# Patient Record
Sex: FEMALE | Race: BLACK OR AFRICAN AMERICAN | Employment: FULL TIME | URBAN - METROPOLITAN AREA
[De-identification: names, ages, dates, MRNs, and addresses within clinical notes are randomized per-mention and may not be internally consistent; named-entity substitution may affect disease eponyms.]

---

## 2020-06-17 ENCOUNTER — ANESTHESIA EVENT (OUTPATIENT)
Dept: SURGERY | Age: 40
End: 2020-06-17
Payer: COMMERCIAL

## 2020-06-17 RX ORDER — HYDROMORPHONE HYDROCHLORIDE 2 MG/ML
0.5 INJECTION, SOLUTION INTRAMUSCULAR; INTRAVENOUS; SUBCUTANEOUS
Status: CANCELLED | OUTPATIENT
Start: 2020-06-17

## 2020-06-17 RX ORDER — SODIUM CHLORIDE, SODIUM LACTATE, POTASSIUM CHLORIDE, CALCIUM CHLORIDE 600; 310; 30; 20 MG/100ML; MG/100ML; MG/100ML; MG/100ML
125 INJECTION, SOLUTION INTRAVENOUS CONTINUOUS
Status: CANCELLED | OUTPATIENT
Start: 2020-06-17

## 2020-06-17 RX ORDER — OXYCODONE HYDROCHLORIDE 5 MG/1
10 TABLET ORAL
Status: CANCELLED | OUTPATIENT
Start: 2020-06-17 | End: 2020-06-18

## 2020-06-17 RX ORDER — NALOXONE HYDROCHLORIDE 0.4 MG/ML
0.1 INJECTION, SOLUTION INTRAMUSCULAR; INTRAVENOUS; SUBCUTANEOUS AS NEEDED
Status: CANCELLED | OUTPATIENT
Start: 2020-06-17

## 2020-06-18 ENCOUNTER — ANESTHESIA (OUTPATIENT)
Dept: SURGERY | Age: 40
End: 2020-06-18
Payer: COMMERCIAL

## 2020-06-18 ENCOUNTER — HOSPITAL ENCOUNTER (OUTPATIENT)
Age: 40
Setting detail: OUTPATIENT SURGERY
Discharge: HOME | End: 2020-06-18
Attending: ORTHOPAEDIC SURGERY | Admitting: ORTHOPAEDIC SURGERY
Payer: COMMERCIAL

## 2020-06-18 ENCOUNTER — APPOINTMENT (OUTPATIENT)
Dept: GENERAL RADIOLOGY | Age: 40
End: 2020-06-18
Attending: ORTHOPAEDIC SURGERY
Payer: COMMERCIAL

## 2020-06-18 VITALS
OXYGEN SATURATION: 95 % | RESPIRATION RATE: 16 BRPM | HEART RATE: 81 BPM | SYSTOLIC BLOOD PRESSURE: 114 MMHG | TEMPERATURE: 98.4 F | BODY MASS INDEX: 40.03 KG/M2 | WEIGHT: 287 LBS | DIASTOLIC BLOOD PRESSURE: 69 MMHG

## 2020-06-18 PROCEDURE — 77030013921: Performed by: ORTHOPAEDIC SURGERY

## 2020-06-18 PROCEDURE — 74011250636 HC RX REV CODE- 250/636: Performed by: ORTHOPAEDIC SURGERY

## 2020-06-18 PROCEDURE — 74011000250 HC RX REV CODE- 250: Performed by: REGISTERED NURSE

## 2020-06-18 PROCEDURE — 77030003748: Performed by: ORTHOPAEDIC SURGERY

## 2020-06-18 PROCEDURE — 74011000250 HC RX REV CODE- 250: Performed by: ANESTHESIOLOGY

## 2020-06-18 PROCEDURE — 77030020274 HC MISC IMPL ORTHOPEDIC: Performed by: ORTHOPAEDIC SURGERY

## 2020-06-18 PROCEDURE — 74011250636 HC RX REV CODE- 250/636: Performed by: REGISTERED NURSE

## 2020-06-18 PROCEDURE — 77030002916 HC SUT ETHLN J&J -A: Performed by: ORTHOPAEDIC SURGERY

## 2020-06-18 PROCEDURE — 76210000063 HC OR PH I REC FIRST 0.5 HR: Performed by: ORTHOPAEDIC SURGERY

## 2020-06-18 PROCEDURE — 76942 ECHO GUIDE FOR BIOPSY: CPT | Performed by: ORTHOPAEDIC SURGERY

## 2020-06-18 PROCEDURE — 76210000026 HC REC RM PH II 1 TO 1.5 HR: Performed by: ORTHOPAEDIC SURGERY

## 2020-06-18 PROCEDURE — 77030037713 HC CLOSR DEV INCIS ZIP STRY -B: Performed by: ORTHOPAEDIC SURGERY

## 2020-06-18 PROCEDURE — 77030000032 HC CUF TRNQT ZIMM -B: Performed by: ORTHOPAEDIC SURGERY

## 2020-06-18 PROCEDURE — C1713 ANCHOR/SCREW BN/BN,TIS/BN: HCPCS | Performed by: ORTHOPAEDIC SURGERY

## 2020-06-18 PROCEDURE — 77030041490 HC NDL KT BN MRW ASPIR CLNG -B: Performed by: ORTHOPAEDIC SURGERY

## 2020-06-18 PROCEDURE — 77030018836 HC SOL IRR NACL ICUM -A: Performed by: ORTHOPAEDIC SURGERY

## 2020-06-18 PROCEDURE — 77030002933 HC SUT MCRYL J&J -A: Performed by: ORTHOPAEDIC SURGERY

## 2020-06-18 PROCEDURE — 74011250636 HC RX REV CODE- 250/636: Performed by: ANESTHESIOLOGY

## 2020-06-18 PROCEDURE — 77030037087 HC DEV ART BMC PRP CLNG -H: Performed by: ORTHOPAEDIC SURGERY

## 2020-06-18 PROCEDURE — 76060000034 HC ANESTHESIA 1.5 TO 2 HR: Performed by: ORTHOPAEDIC SURGERY

## 2020-06-18 PROCEDURE — 74011000250 HC RX REV CODE- 250: Performed by: ORTHOPAEDIC SURGERY

## 2020-06-18 PROCEDURE — 76010000162 HC OR TIME 1.5 TO 2 HR INTENSV-TIER 1: Performed by: ORTHOPAEDIC SURGERY

## 2020-06-18 PROCEDURE — 77030010507 HC ADH SKN DERMBND J&J -B: Performed by: ORTHOPAEDIC SURGERY

## 2020-06-18 PROCEDURE — 76010010054 HC POST OP PAIN BLOCK: Performed by: ORTHOPAEDIC SURGERY

## 2020-06-18 PROCEDURE — 74011250637 HC RX REV CODE- 250/637: Performed by: ANESTHESIOLOGY

## 2020-06-18 PROCEDURE — 77030025281 HC SPLNT ORTHGLS 1 BSNM -B: Performed by: ORTHOPAEDIC SURGERY

## 2020-06-18 PROCEDURE — 77030020275 HC MISC ORTHOPEDIC: Performed by: ORTHOPAEDIC SURGERY

## 2020-06-18 RX ORDER — CEFAZOLIN SODIUM/WATER 2 G/20 ML
2 SYRINGE (ML) INTRAVENOUS ONCE
Status: DISCONTINUED | OUTPATIENT
Start: 2020-06-18 | End: 2020-06-18 | Stop reason: DRUGHIGH

## 2020-06-18 RX ORDER — SODIUM CHLORIDE 0.9 % (FLUSH) 0.9 %
5-40 SYRINGE (ML) INJECTION EVERY 8 HOURS
Status: DISCONTINUED | OUTPATIENT
Start: 2020-06-18 | End: 2020-06-18 | Stop reason: HOSPADM

## 2020-06-18 RX ORDER — LIDOCAINE HYDROCHLORIDE 10 MG/ML
0.1 INJECTION INFILTRATION; PERINEURAL AS NEEDED
Status: DISCONTINUED | OUTPATIENT
Start: 2020-06-18 | End: 2020-06-18 | Stop reason: HOSPADM

## 2020-06-18 RX ORDER — ONDANSETRON 2 MG/ML
INJECTION INTRAMUSCULAR; INTRAVENOUS AS NEEDED
Status: DISCONTINUED | OUTPATIENT
Start: 2020-06-18 | End: 2020-06-18 | Stop reason: HOSPADM

## 2020-06-18 RX ORDER — DEXAMETHASONE SODIUM PHOSPHATE 4 MG/ML
INJECTION, SOLUTION INTRA-ARTICULAR; INTRALESIONAL; INTRAMUSCULAR; INTRAVENOUS; SOFT TISSUE AS NEEDED
Status: DISCONTINUED | OUTPATIENT
Start: 2020-06-18 | End: 2020-06-18 | Stop reason: HOSPADM

## 2020-06-18 RX ORDER — LIDOCAINE HYDROCHLORIDE 20 MG/ML
INJECTION, SOLUTION EPIDURAL; INFILTRATION; INTRACAUDAL; PERINEURAL AS NEEDED
Status: DISCONTINUED | OUTPATIENT
Start: 2020-06-18 | End: 2020-06-18 | Stop reason: HOSPADM

## 2020-06-18 RX ORDER — SODIUM CHLORIDE 0.9 % (FLUSH) 0.9 %
5-40 SYRINGE (ML) INJECTION AS NEEDED
Status: DISCONTINUED | OUTPATIENT
Start: 2020-06-18 | End: 2020-06-18 | Stop reason: HOSPADM

## 2020-06-18 RX ORDER — PROPOFOL 10 MG/ML
INJECTION, EMULSION INTRAVENOUS AS NEEDED
Status: DISCONTINUED | OUTPATIENT
Start: 2020-06-18 | End: 2020-06-18 | Stop reason: HOSPADM

## 2020-06-18 RX ORDER — GABAPENTIN 300 MG/1
300 CAPSULE ORAL
Status: COMPLETED | OUTPATIENT
Start: 2020-06-18 | End: 2020-06-18

## 2020-06-18 RX ORDER — BUPIVACAINE HYDROCHLORIDE AND EPINEPHRINE 5; 5 MG/ML; UG/ML
INJECTION, SOLUTION EPIDURAL; INTRACAUDAL; PERINEURAL AS NEEDED
Status: DISCONTINUED | OUTPATIENT
Start: 2020-06-18 | End: 2020-06-18 | Stop reason: HOSPADM

## 2020-06-18 RX ORDER — FENTANYL CITRATE 50 UG/ML
100 INJECTION, SOLUTION INTRAMUSCULAR; INTRAVENOUS ONCE
Status: COMPLETED | OUTPATIENT
Start: 2020-06-18 | End: 2020-06-18

## 2020-06-18 RX ORDER — SODIUM CHLORIDE, SODIUM LACTATE, POTASSIUM CHLORIDE, CALCIUM CHLORIDE 600; 310; 30; 20 MG/100ML; MG/100ML; MG/100ML; MG/100ML
125 INJECTION, SOLUTION INTRAVENOUS CONTINUOUS
Status: DISCONTINUED | OUTPATIENT
Start: 2020-06-18 | End: 2020-06-18 | Stop reason: HOSPADM

## 2020-06-18 RX ORDER — HEPARIN SODIUM,PORCINE/PF 10 UNIT/ML
SYRINGE (ML) INTRAVENOUS AS NEEDED
Status: DISCONTINUED | OUTPATIENT
Start: 2020-06-18 | End: 2020-06-18 | Stop reason: HOSPADM

## 2020-06-18 RX ORDER — MIDAZOLAM HYDROCHLORIDE 1 MG/ML
2 INJECTION, SOLUTION INTRAMUSCULAR; INTRAVENOUS ONCE
Status: COMPLETED | OUTPATIENT
Start: 2020-06-18 | End: 2020-06-18

## 2020-06-18 RX ORDER — ACETAMINOPHEN 500 MG
1000 TABLET ORAL ONCE
Status: COMPLETED | OUTPATIENT
Start: 2020-06-18 | End: 2020-06-18

## 2020-06-18 RX ADMIN — MEPIVACAINE HYDROCHLORIDE 5 ML: 20 INJECTION, SOLUTION EPIDURAL; INFILTRATION at 08:22

## 2020-06-18 RX ADMIN — PHENYLEPHRINE HYDROCHLORIDE 100 MCG: 10 INJECTION INTRAVENOUS at 09:41

## 2020-06-18 RX ADMIN — MIDAZOLAM 2 MG: 1 INJECTION INTRAMUSCULAR; INTRAVENOUS at 08:13

## 2020-06-18 RX ADMIN — PROPOFOL 200 MG: 10 INJECTION, EMULSION INTRAVENOUS at 09:11

## 2020-06-18 RX ADMIN — FENTANYL CITRATE 100 MCG: 50 INJECTION INTRAMUSCULAR; INTRAVENOUS at 08:13

## 2020-06-18 RX ADMIN — PHENYLEPHRINE HYDROCHLORIDE 100 MCG: 10 INJECTION INTRAVENOUS at 09:25

## 2020-06-18 RX ADMIN — BUPIVACAINE HYDROCHLORIDE AND EPINEPHRINE BITARTRATE 20 ML: 5; .005 INJECTION, SOLUTION EPIDURAL; INTRACAUDAL; PERINEURAL at 08:19

## 2020-06-18 RX ADMIN — ONDANSETRON 4 MG: 2 INJECTION INTRAMUSCULAR; INTRAVENOUS at 10:26

## 2020-06-18 RX ADMIN — PHENYLEPHRINE HYDROCHLORIDE 100 MCG: 10 INJECTION INTRAVENOUS at 09:54

## 2020-06-18 RX ADMIN — PHENYLEPHRINE HYDROCHLORIDE 100 MCG: 10 INJECTION INTRAVENOUS at 09:22

## 2020-06-18 RX ADMIN — BUPIVACAINE HYDROCHLORIDE AND EPINEPHRINE BITARTRATE 5 ML: 5; .005 INJECTION, SOLUTION EPIDURAL; INTRACAUDAL; PERINEURAL at 08:22

## 2020-06-18 RX ADMIN — PHENYLEPHRINE HYDROCHLORIDE 200 MCG: 10 INJECTION INTRAVENOUS at 10:01

## 2020-06-18 RX ADMIN — GABAPENTIN 300 MG: 300 CAPSULE ORAL at 08:16

## 2020-06-18 RX ADMIN — LIDOCAINE HYDROCHLORIDE 60 MG: 20 INJECTION, SOLUTION EPIDURAL; INFILTRATION; INTRACAUDAL; PERINEURAL at 09:11

## 2020-06-18 RX ADMIN — ACETAMINOPHEN 1000 MG: 500 TABLET, FILM COATED ORAL at 08:15

## 2020-06-18 RX ADMIN — PHENYLEPHRINE HYDROCHLORIDE 200 MCG: 10 INJECTION INTRAVENOUS at 10:19

## 2020-06-18 RX ADMIN — SODIUM CHLORIDE, SODIUM LACTATE, POTASSIUM CHLORIDE, AND CALCIUM CHLORIDE 125 ML/HR: 600; 310; 30; 20 INJECTION, SOLUTION INTRAVENOUS at 08:00

## 2020-06-18 RX ADMIN — PHENYLEPHRINE HYDROCHLORIDE 100 MCG: 10 INJECTION INTRAVENOUS at 09:18

## 2020-06-18 RX ADMIN — DEXAMETHASONE SODIUM PHOSPHATE 4 MG: 4 INJECTION, SOLUTION INTRAMUSCULAR; INTRAVENOUS at 09:30

## 2020-06-18 RX ADMIN — CEFAZOLIN 3 G: 1 INJECTION, POWDER, FOR SOLUTION INTRAVENOUS at 09:17

## 2020-06-18 RX ADMIN — PHENYLEPHRINE HYDROCHLORIDE 200 MCG: 10 INJECTION INTRAVENOUS at 10:07

## 2020-06-18 RX ADMIN — MEPIVACAINE HYDROCHLORIDE 20 ML: 20 INJECTION, SOLUTION EPIDURAL; INFILTRATION at 08:19

## 2020-06-18 NOTE — BRIEF OP NOTE
Brief Postoperative Note    Patient: Jannette Burroughs  YOB: 1980  MRN: [id number]    Date of Procedure: 6/18/2020     Pre-Op Diagnosis: Arthritis of ankle, right [M19.071]    Post-Op Diagnosis: Same as preoperative diagnosis. Procedure(s):  RIGHT ACHILLES LENGTHENING  RIGHT ANKLE FUSION W/ CALCANEAL BONE MARROW ASPIRATE CHOICE ANES    Surgeon(s):  Hannah Azul MD    Surgical Assistant: None    Anesthesia: General     Estimated Blood Loss (mL): Minimal    Complications: None    Specimens: * No specimens in log *     Implants:   Implant Name Type Inv.  Item Serial No.  Lot No. LRB No. Used Action   [employer] ANTERIOR RIGHT PLATE     [id number] Right 1 Implanted   AR[employer] 4.5 LOCKING 32 SCREW     [id number] Right 3 Implanted   [employer] BB-TOK     [id number] Right 1 Implanted   AR[employer] 4.5 X 30MM SCREW     [id number] Right 2 Implanted   ARTHERX 4.5 X 28MM LOCKING SCREW    [employer] [id number] Right 1 Implanted   AR[employer]X 4.5 X 24MM SCREW    [employer] [id number] Right 1 Implanted   ARTHREX 5.5 X 55MM SCREW    [employer] [id number] Right 1 Implanted   ARTH[employer]X SOLUM IV      [id number] Right 1 Implanted       Drains: * No LDAs found *    Findings:     Electronically Signed by Pepe Banegas MD on 6/18/2020 at 4:46 PM

## 2020-06-18 NOTE — ANESTHESIA POSTPROCEDURE EVALUATION
Procedure(s):  RIGHT ACHILLES LENGTHENING  RIGHT ANKLE FUSION W/ CALCANEAL BONE MARROW ASPIRATE CHOICE ANES. general    Anesthesia Post Evaluation        Patient location during evaluation: PACU  Patient participation: complete - patient participated  Level of consciousness: responsive to verbal stimuli  Pain management: adequate  Airway patency: patent  Anesthetic complications: no  Cardiovascular status: acceptable  Respiratory status: acceptable  Hydration status: euvolemic        INITIAL Post-op Vital signs:   Vitals Value Taken Time   /76 6/18/2020 11:03 AM   Temp     Pulse 77 6/18/2020 11:05 AM   Resp 16 6/18/2020 11:03 AM   SpO2 96 % 6/18/2020 11:05 AM   Vitals shown include unvalidated device data.

## 2020-06-18 NOTE — ANESTHESIA PREPROCEDURE EVALUATION
Relevant Problems   No relevant active problems       Anesthetic History               Review of Systems / Medical History  Patient summary reviewed, nursing notes reviewed and pertinent labs reviewed    Pulmonary          Smoker (Ex)         Neuro/Psych              Cardiovascular    Hypertension              Exercise tolerance: >4 METS  Comments: Hx postpartum DVT   GI/Hepatic/Renal     GERD: well controlled           Endo/Other        Morbid obesity and arthritis     Other Findings   Comments: Sjogren's dz         Physical Exam    Airway  Mallampati: I  TM Distance: 4 - 6 cm  Neck ROM: normal range of motion   Mouth opening: Normal     Cardiovascular  Regular rate and rhythm,  S1 and S2 normal,  no murmur, click, rub, or gallop             Dental  No notable dental hx       Pulmonary  Breath sounds clear to auscultation               Abdominal  GI exam deferred       Other Findings            Anesthetic Plan    ASA: 3  Anesthesia type: general      Post-op pain plan if not by surgeon: peripheral nerve block single      Anesthetic plan and risks discussed with: Patient    Sister present

## 2020-06-18 NOTE — ANESTHESIA PROCEDURE NOTES
Peripheral Block    Start time: 6/18/2020 8:20 AM  End time: 6/18/2020 8:22 AM  Performed by: Pedrito Sosa MD  Authorized by: Pedrito Sosa MD       Pre-procedure:    Indications: at surgeon's request and post-op pain management    Preanesthetic Checklist: patient identified, risks and benefits discussed, site marked, timeout performed, anesthesia consent given and patient being monitored    Timeout Time: 08:20          Block Type:   Block Type:  Femoral single shot  Laterality:  Right  Monitoring:  Responsive to questions, standard ASA monitoring, continuous pulse ox, oxygen, frequent vital sign checks and heart rate  Injection Technique:  Single shot  Procedures: ultrasound guided and nerve stimulator    Patient Position: supine  Prep: chlorhexidine    Location:  Upper thigh  Needle Type:  Stimuplex  Needle Gauge:  22 G  Needle Localization:  Nerve stimulator and ultrasound guidance  Motor Response: minimal motor response >0.4 mA      Assessment:  Number of attempts:  1  Injection Assessment:  Incremental injection every 5 mL, no paresthesia, ultrasound image on chart, local visualized surrounding nerve on ultrasound, negative aspiration for blood and no intravascular symptoms  Patient tolerance:  Patient tolerated the procedure well with no immediate complications

## 2020-06-18 NOTE — DISCHARGE INSTRUCTIONS
INSTRUCTIONS FOLLOWING FOOT SURGERY    ACTIVITY  Elevate foot. No Ice   Let the office know if dressing gets saturated with water . No weight bearing. Use crutches or knee walker until seen in follow up appointment  Don't put anything into the splint to relieve itching etc. Take one 325mg aspirin daily if okay with your medical doctor and you have no GI ulcer. Get up and out of bed frequently. While in bed move the legs as much as possible)      DRESSING CARE Keep dry and in place until follow up appointment      DIET  Day of Surgery: Clear liquids until no nausea or vomiting; then light, bland diet (Baked chicken, plain rice, grits, scrambled egg, toast). Nothing Greasy, fried or spicy today  Advance to regular diet on second day, unless your doctor orders otherwise. PAIN  Take pain medications as directed by your doctor. Call your doctor if pain is NOT relieved by medication. PAIN MED SIDE EFFECTS  Constipation: Lots of fluids, try prune juice, then OTC stool softeners if necessary  Nausea: Take medication with food. CALL YOUR DOCTOR IF YOU HAVE  Excessive bleeding that does not stop after holding mild pressure over the area. Temperature of 101 degrees or above. Redness, excessive swelling or bruising, and/or green or yellow, smelly discharge from incision. Loss of sensation - cold, white or blue toes. AFTER ANESTHESIA  For the first 24 hours and while taking narcotics for pain: DO NOT Drive, Drink Alcoholic beverages, or make important Decisions. Be aware of dizziness following anesthesia and while taking pain medication. Preventing Infection at Home  We care about preventing infection and avoiding the spread of germs - not only when you are in the hospital but also when you return home. When you return home from the hospital, its important to take the following steps to help prevent infection and avoid spreading germs that could infect you and others.  Ask everyone in your home to follow these guidelines, too. Clean Your Hands  · Clean your hands whenever your hands are visibly dirty, before you eat, before or after touching your mouth, nose or eyes, and before preparing food. Clean them after contact with body fluids, using the restroom, touching animals or changing diapers. · When washing hands, wet them with warm water and work up a lather. Rub hands for at least 15 seconds, then rinse them and pat them dry with a clean towel or paper towel. · When using hand sanitizers, it should take about 15 seconds to rub your hands dry. If not, you probably didnt apply enough . Cover Your Sneeze or Cough  Germs are released into the air whenever you sneeze or cough. To prevent the spread of infection:  · Turn away from other people before coughing or sneezing. · Cover your mouth or nose with a tissue when you cough or sneeze. Put the tissue in the trash. · If you dont have a tissue, cough or sneeze into your upper sleeve, not your hands. · Always clean your hands after coughing or sneezing. Care for Wounds  Your skin is your bodys first line of defense against germs, but an open wound leaves an easy way for germs to enter your body. To prevent infection:  · Clean your hands before and after changing wound dressings, and wear gloves to change dressings if recommended by your doctor. · Take special care with IV lines or other devices inserted into the body. If you must touch them, clean your hands first.  · Follow any specific instructions from your doctor to care for your wounds. Contact your doctor if you experience any signs of infection, such as fever or increased redness at the surgical or wound site. Keep a Clean Home  · Clean or wipe commonly touched hard surfaces like door handles, sinks, tabletops, phones and TV remotes. · Use products labeled disinfectant to kill harmful bacteria and viruses. · Use a clean cloth or paper towel to clean and dry surfaces.  Wiping surfaces with a dirty dishcloth, sponge or towel will only spread germs. · Never share toothbrushes, zambrano, drinking glasses, utensils, razor blades, face cloths or bath towels to avoid spreading germs. · Be sure that the linens that you sleep on are clean. · Keep pets away from wounds and wash your hands after touching pets, their toys or bedding. We care about you and your health. Remember, preventing infections is a team effort between you, your family, friends and health care providers. DISCHARGE SUMMARY from Nurse    PATIENT INSTRUCTIONS:    After general anesthesia or intravenous sedation, for 24 hours or while taking prescription Narcotics:  · Limit your activities  · Do not drive and operate hazardous machinery  · Do not make important personal or business decisions  · Do  not drink alcoholic beverages  · If you have not urinated within 8 hours after discharge, please contact your surgeon on call. *  Please give a list of your current medications to your Primary Care Provider. *  Please update this list whenever your medications are discontinued, doses are      changed, or new medications (including over-the-counter products) are added. *  Please carry medication information at all times in case of emergency situations. These are general instructions for a healthy lifestyle:    No smoking/ No tobacco products/ Avoid exposure to second hand smoke    Surgeon General's Warning:  Quitting smoking now greatly reduces serious risk to your health.     Obesity, smoking, and sedentary lifestyle greatly increases your risk for illness    A healthy diet, regular physical exercise & weight monitoring are important for maintaining a healthy lifestyle    You may be retaining fluid if you have a history of heart failure or if you experience any of the following symptoms:  Weight gain of 3 pounds or more overnight or 5 pounds in a week, increased swelling in our hands or feet or shortness of breath while lying flat in bed. Please call your doctor as soon as you notice any of these symptoms; do not wait until your next office visit. Recognize signs and symptoms of STROKE:    F-face looks uneven    A-arms unable to move or move unevenly    S-speech slurred or non-existent    T-time-call 911 as soon as signs and symptoms begin-DO NOT go       Back to bed or wait to see if you get better-TIME IS BRAIN. Learning About How to Use Crutches  Your Care Instructions  Crutches can help you walk when you have an injured hip, leg, knee, ankle, or foot. Your doctor will tell you how much weight--if any--you can put on your leg. Be sure your crutches fit you. When you stand up in your normal posture, there should be space for two or three fingers between the top of the crutch and your armpit. When you let your hands hang down, the hand  should be at your wrists. When you put your hands on the hand , your elbows should be slightly bent. To stay safe when using crutches:  · Look straight ahead, not down at your feet. · Clear away small rugs, cords, or anything else that could cause you to trip, slip, or fall. · Be very careful around pets and small children. They can get in your path when you least expect it. · Be sure the rubber tips on your crutches are clean and in good condition to help prevent slipping. · Avoid slick conditions, such as wet floors and snowy or icy driveways. In bad weather, be especially careful on curbs and steps. How to use crutches  Getting ready to walk    1. Bend your elbows slightly. Press the padded top parts of the crutches against your sides, under your armpits. 2. If you have been told not to put any weight on your injured leg, keep that leg bent and off the ground. Walking with crutches    1. Put both crutches about 12 inches in front of you. 2. Put your weight on the handgrips, not on the pads under your arms.  (Constant pressure against your underarms can cause numbness.) Swing your body forward. (If you have been told not to put any weight on your injured leg, keep that leg bent and off the ground.)  3. To complete the step, put your weight on the strong leg. 4. Move your crutches about 12 inches in front of you, and start the next step. 5. When you're confident using the crutches, you can move the crutches and your injured leg at the same time. Then push straight down on the crutches as you step past the crutches with your strong leg, as you would in normal walking. 6. Take small steps. 7. Use ramps and elevators when you can. Sitting down    1. To sit, back up to the chair. Use one hand to hold both crutches by the handgrips, beside your injured leg. With the other hand, hold onto the seat and slowly lower yourself onto the chair. 2. Lay the crutches on the ground near your chair. If you prop them up, they may fall over. Getting up from a chair    1. To get up from a chair,  the crutches and put them in one hand beside your injured leg. 2. Put your weight on the handgrips of the crutches and on your strong leg to stand up. Walking up stairs    1. To go up stairs, step up with your strong leg and then bring the crutches and your injured leg to the upper step. 2. For stairs that have handrails: Put both crutches under the arm opposite the handrail. Use the hand opposite the handrail to hold both crutches by the handgrips. 3. Hold onto the handrail as you go up. Put your strong leg on the step first when you go up. Walking down stairs    1. To go down stairs, put your crutches and injured leg on the lower step. 2. Bring your strong leg to the lower step. This saying may help you remember: \"Up with the good, down with the bad. \"  3. For stairs that have handrails: Put both crutches under the arm opposite the handrail. Use the hand opposite the handrail to hold both crutches by the handgrips. Hold onto the handrail as you go down.  Follow the same process you use for stairs: Lead with your crutches and injured leg on the way down. Follow-up care is a key part of your treatment and safety. Be sure to make and go to all appointments, and call your doctor if you are having problems. It's also a good idea to know your test results and keep a list of the medicines you take. Where can you learn more? Go to http://burt-daphne.info/. Enter X802 in the search box to learn more about \"Learning About How to Use Crutches. \"  Current as of: August 4, 2016  Content Version: 11.2  © 0250-2239 [employer]. Care instructions adapted under license by [employer] (which disclaims liability or warranty for this information). If you have questions about a medical condition or this instruction, always ask your healthcare professional. [address] any warranty or liability for your use of this information. Learning About a Peripheral Nerve Block  What is a peripheral nerve block? For some surgeries, a doctor may do a peripheral nerve block to numb the part of the body involved, often an arm or a leg. Peripheral nerves lead from the spinal cord to other parts of the body, carrying signals for movement and feeling. The nerve block is sometimes used with medicine that makes you sleep during the surgery. Or the nerve block may be all that's needed, and you can stay awake without feeling any pain. The nerve block may also help keep your pain level lower after the surgery. How is a peripheral nerve block done? Before the nerve block, you will have a tube called an IV placed in your arm. This will be used to give you medicine to make you sleep or keep you comfortable. The doctor may use ultrasound, X-ray, or another imaging method to help guide the needle that will be used for the nerve block. After finding the right spot, the doctor uses a tiny needle to numb the skin.  Then he or she puts the nerve block needle into the numbed area. If you are awake, you may feel some pressure. But you should not feel pain. What can you expect after a peripheral nerve block? The nerve block will leave the area that was numbed, like your arm or leg, partly or totally numb for a while. Your doctor will tell you how long. Follow your doctor's instructions carefully. If you'll be going home right after the surgery, you'll need someone to drive you. As the block wears off, you will start to feel some pain from the surgery. Be sure to take your pain medicines before the pain gets bad. Problems from a nerve block are rare. There is a small risk of problems like seizures, heart problems, damage to nerves, infection, or bleeding. The benefits usually outweigh these risks. Follow-up care is a key part of your treatment and safety. Be sure to make and go to all appointments, and call your doctor if you are having problems. It's also a good idea to know your test results and keep a list of the medicines you take. Where can you learn more? Go to http://burt-daphne.info/  Enter A594 in the search box to learn more about \"Learning About a Peripheral Nerve Block. \"  Current as of: August 22, 2019               Content Version: 12.5  © 9213-0494 [employer]. Care instructions adapted under license by [employer] (which disclaims liability or warranty for this information). If you have questions about a medical condition or this instruction, always ask your healthcare professional. [place] [address] any warranty or liability for your use of this information. Caring for Your Splint: After Your Visit  Your Care Instructions     A splint protects a broken bone or other injury. If you have a removable splint, follow your doctor's instructions and only remove the splint if your doctor says you can. Most splints can be adjusted.  Your doctor will show you how to do this and will tell you when you might need to adjust the splint. Many splints are premade. Your doctor may also make a splint from plaster or fiberglass. Some splints have a built-in air cushion. Air pads are inflated to hold the injured area in place. Follow-up care is a key part of your treatment and safety. Be sure to make and go to all appointments, and call your doctor if you are having problems. It's also a good idea to know your test results and keep a list of the medicines you take. How can you care for yourself at home? General care  · Don't put any weight on a splint. If you have a walking boot, your doctor will tell you when you can put weight on it. · If the fingers or toes on the limb with the splint were not injured, wiggle them every now and then. This helps move the blood and fluids in the injured limb. · Prop up the injured arm or leg on a pillow when you ice it or anytime you sit or lie down during the next 3 days. Try to keep it above the level of your heart. This will help reduce swelling. · Put ice or cold packs on the hurt area for 10 to 20 minutes at a time. Try to do this every 1 to 2 hours for the next 3 days (when you are awake) or until the swelling goes down. Be careful not to get the splint wet. · Be safe with medicines. Read and follow all instructions on the label. ¨ If the doctor gave you a prescription medicine for pain, take it as prescribed. ¨ If you are not taking a prescription pain medicine, ask your doctor if you can take an over-the-counter medicine. · Keep up your muscle strength and tone as much as you can while protecting your injured limb or joint. Your doctor may want you to tense and relax the muscles protected by the splint. Check with your doctor or physical therapist for instructions. Splint and skin care  · If your splint is not to be removed, try blowing cool air from a hair dryer or fan into the splint to help relieve itching.  Never stick items under your splint to scratch the skin. · Do not use oils or lotions near your splint. If the skin becomes red or sore around the edge of the splint, you may pad the edges with a soft material, such as moleskin, or use tape to cover the edges. · If you're allowed to take your splint off, be sure your skin is dry before you put it back on. · Watch for pressure sores. These can develop over bony areas. Symptoms include a warm spot under the cast, pain, drainage, or an odor. Call your doctor if you think you have a pressure sore. · Watch for compartment syndrome. This happens when pressure builds up in a group of muscles, nerves, and blood vessels. It is an emergency. Symptoms include severe pain or tingling or numbness. Water and your splint  · Keep your splint dry. Moisture can collect under the splint and cause skin irritation and itching. If you have a wound or have had surgery, moisture under the splint can increase the risk of infection. · Cover your splint with at least two layers of plastic when you take a shower or bath, unless your doctor said you can take it off while bathing. · If you can take the splint off when you bathe, pat the area dry after bathing and put the splint back on.  · If your splint gets a little wet, you can dry it with a hair dryer. Use a \"cool\" setting. When should you call for help? Call your doctor now or seek immediate medical care if:  · You have increased or severe pain. · You feel a warm or painful spot under the splint. · You have problems with your splint. For example:  ¨ The skin under the splint burns or stings. ¨ The splint feels too tight. ¨ There is a lot of swelling near the splint. (Some swelling is normal.)  ¨ You have a new fever. ¨ There is drainage or a bad smell coming from the splint. · Your foot or hand is cool or pale or changes color. · You have trouble moving your fingers or toes. · You have symptoms of a blood clot in your arm or leg (called a deep vein thrombosis). These may include:  ¨ Pain in the arm, calf, back of the knee, thigh, or groin. ¨ Redness and swelling in the arm, leg, or groin. Watch closely for changes in your health, and be sure to contact your doctor if:  · The splint is breaking apart or losing its shape. · You are not getting better as expected. Where can you learn more? Go to [employer]be  Enter O140 in the search box to learn more about \"Caring for Your Splint: After Your Visit. \"   © 1635-5343 [employer]wise, Inc[employer]. Care instructions adapted under license by [facility] (which disclaims liability or warranty for this information). This care instruction is for use with your licensed healthcare professional. If you have questions about a medical condition or this instruction, always ask your healthcare professional. [address] any warranty or liability for your use of this information.   Content Version: 88.1.824353; Current as of: June 4, 2014

## 2020-06-18 NOTE — ANESTHESIA PROCEDURE NOTES
Peripheral Block    Start time: 6/18/2020 8:14 AM  End time: 6/18/2020 8:19 AM  Performed by: Jovanna Milian MD  Authorized by: Jovanna Milian MD       Pre-procedure:    Indications: at surgeon's request and post-op pain management    Preanesthetic Checklist: patient identified, risks and benefits discussed, site marked, timeout performed, anesthesia consent given and patient being monitored    Timeout Time: 08:13          Block Type:   Block Type:  Popliteal  Laterality:  Right  Monitoring:  Responsive to questions, standard ASA monitoring, continuous pulse ox, oxygen, frequent vital sign checks and heart rate  Injection Technique:  Single shot  Procedures: ultrasound guided and nerve stimulator    Patient Position: supine  Prep: chlorhexidine    Location:  Lower thigh  Needle Type:  Stimuplex  Needle Gauge:  22 G  Needle Localization:  Ultrasound guidance and nerve stimulator  Motor Response: minimal motor response >0.4 mA      Assessment:  Number of attempts:  1  Injection Assessment:  Incremental injection every 5 mL, no paresthesia, ultrasound image on chart, no intravascular symptoms, negative aspiration for blood and local visualized surrounding nerve on ultrasound  Patient tolerance:  Patient tolerated the procedure well with no immediate complications

## 2020-06-18 NOTE — OP NOTES
FULL OP NOTE    PATIENT NAME: Radha Rangel  MRN: [id number]    DATE OF SURGERY: 6/18/2020    PREOPERATIVE DIAGNOSIS: Arthritis of ankle, right [M19.071]      POSTOPERATIVE DIAGNOSIS: Arthritis of ankle, right [M19.071]    PROCEDURE:1.  Right open ankle arthrodesis, 07703                            2.  Right triple hemisection Achilles lengthening, 45925                             3.  Right calcaneal autograft harvest, 20900    SURGEON: Chritsine Vargas MD    HARDWARE:   Implant Name Type Inv. Item Serial No.  Lot No. LRB No. Used Action   [employer] ANTERIOR RIGHT PLATE     [id number] Right 1 Implanted   [employer] 4.5 LOCKING 32 SCREW     [id number] Right 3 Implanted   [employer] BB-TOK     [id number] Right 1 Implanted   [employer] 4.5 X 30MM SCREW     [id number] Right 2 Implanted   [employer] 4.5 X 28MM LOCKING SCREW    [employer] [id number] Right 1 Implanted   [employer] 4.5 X 24MM SCREW    [employer] [id number] Right 1 Implanted   [employer] 5.5 X 55MM SCREW    [employer] [id number] Right 1 Implanted   [employer] SOLUM IV      [id number] Right 1 Implanted     INDICATIONS: This patient is a 59-year-old female with a symptomatic end-stage right ankle arthritis who is failed conservative therapy. She desires surgical treatment. Risks and benefits the procedure include but not limited to risk of anesthetic complications as well surgical complications including damage nerves blood vessels risk of infection, risk incomplete pain relief, risk of malunion, risk of nonunion, need for bracing, need for additional surgery discussed with the patient. She understands the risks and wishes to proceed with surgery at this time. PROCEDURE IN DETAIL: A time out was done to confirm the operating procedure, surgeon, patient and site. A block was placed by department of anesthesia.   During a preop surgical timeout the right lower extremity identified to correct surgical site and prepped draped in standard sterile fashion ChloraPrep solution. A triple hemisection Achilles lengthening was performed to 3 small stab incision of the Achilles. Large-bore needle was then introduced into the lateral calcaneal wall where bone marrow aspirate was obtained later mixed with a carrier and placed into the arthrodesis site. Anterior approach of the ankle was informed that time. The anterior joint was then identified. There is a large osteophyte anteriorly which was removed using osteotome. There was complete cartilage loss about 75% of the tibiotalar joint. The joint was prepared using a curette followed by a drill bit and osteotome. The ankle was then reduced into desired alignment of the arthrodesis and anterior ankle fusion plate was affixed using appropriately nonlocking and locking screws. This was confirmed to be adequately placed on image edification. The wounds were irrigated and closed using Monocryl and nylon sutures. Sterile dressings and applied followed by well-padded posterior splint. Anesthesia was discontinued. Patient transferred back to recovery bed. She was taken recovery in satisfactory condition. She appeared tolerate the procedure well. There were no apparent surgical or anesthetic complications. All needle and sponge counts correct. Postop patient be nonweightbearing in a splint and take aspirin 325 mg daily for DVT prophylaxis. TOURNIQUET TIME: Approx 65 minutes. SPECIMENS: none    ESTIMATED BLOOD LOSS: min mL.

## (undated) DEVICE — LARGE TEAR CROSS CUT RASP (14.0 X 7.0MM)

## (undated) DEVICE — BANDAGE,GAUZE,BULKEE II,4.5"X4.1YD,STRL: Brand: MEDLINE

## (undated) DEVICE — NON-ADHERING DRESSING: Brand: ADAPTIC®

## (undated) DEVICE — REM POLYHESIVE ADULT PATIENT RETURN ELECTRODE: Brand: VALLEYLAB

## (undated) DEVICE — AMD ANTIMICROBIAL GAUZE SPONGES,12 PLY USP TYPE VII, 0.2% POLYHEXAMETHYLENE BIGUANIDE HCI (PHMB): Brand: CURITY

## (undated) DEVICE — PADDING CAST W4INXL4YD ST COT COHESIVE HND TEARABLE SPEC

## (undated) DEVICE — BANDAGE COBAN 6 IN WND 6INX5YD FOAM

## (undated) DEVICE — ZIMMER® STERILE DISPOSABLE TOURNIQUET CUFF WITH PLC, DUAL PORT, SINGLE BLADDER, 18 IN. (46 CM)

## (undated) DEVICE — CURITY NON-ADHERENT STRIPS: Brand: CURITY

## (undated) DEVICE — FOOT & ANKLE SOFT DR WOMACK: Brand: MEDLINE INDUSTRIES, INC.

## (undated) DEVICE — BUTTON SWITCH PENCIL BLADE ELECTRODE, HOLSTER: Brand: EDGE

## (undated) DEVICE — AMD ANTIMICROBIAL BANDAGE ROLL,6 PLY: Brand: KERLIX

## (undated) DEVICE — DRAPE XR C ARM 41X74IN LF --

## (undated) DEVICE — (D)PREP SKN CHLRAPRP APPL 26ML -- CONVERT TO ITEM 371833

## (undated) DEVICE — DRAPE SHT 3 QTR PROXIMA 53X77 --

## (undated) DEVICE — SMALL VOLUME BONE MARROW ASPIRATION KIT: Brand: SPINESMITH FUSIONARY GRAFT DELIVERY SYSTEM

## (undated) DEVICE — SUT ETHLN 3-0 18IN PS2 BLK --

## (undated) DEVICE — ART BMC PLUS PROCESSING KIT: Brand: CELLING ART BMC SYSTEM

## (undated) DEVICE — BNDG,ELSTC,MATRIX,STRL,4"X5YD,LF,HOOK&LP: Brand: MEDLINE

## (undated) DEVICE — SUTURE MCRYL SZ 3-0 L27IN ABSRB UD L19MM PS-2 3/8 CIR PRIM Y427H

## (undated) DEVICE — GOWN,REINF,POLY,ECL,PP SLV,XL: Brand: MEDLINE

## (undated) DEVICE — BNDG ELAS ESMARK 4INX12FT LF -- STRL

## (undated) DEVICE — DERMABOND SKIN ADH 0.7ML -- DERMABOND ADVANCED 12/BX

## (undated) DEVICE — SOLUTION IV 1000ML 0.9% SOD CHL

## (undated) DEVICE — BIT DRL DIA3MM CALIB FOR ANK FUS PLATING SYS

## (undated) DEVICE — SPONGE LAP 18X18IN STRL -- 5/PK

## (undated) DEVICE — GRAFT DELIVERY KIT: Brand: SPINESMITH FUSIONARY GRAFT DELIVERY SYSTEM

## (undated) DEVICE — SPONGE GZ W4XL4IN COT 12 PLY TYP VII WVN C FLD DSGN

## (undated) DEVICE — SPLINT CAST W4XL30IN WHT THMB FBRGLS PRECUT INTLOK WRINKLE

## (undated) DEVICE — ZIP 16 SURGICAL SKIN CLOSURE DEVICE: Brand: ZIP 16 SURGICAL SKIN CLOSURE DEVICE

## (undated) DEVICE — PAD,ABDOMINAL,5"X9",ST,LF,25/BX: Brand: MEDLINE INDUSTRIES, INC.

## (undated) DEVICE — 2000CC GUARDIAN II: Brand: GUARDIAN